# Patient Record
Sex: MALE
[De-identification: names, ages, dates, MRNs, and addresses within clinical notes are randomized per-mention and may not be internally consistent; named-entity substitution may affect disease eponyms.]

---

## 2023-01-22 ENCOUNTER — NURSE TRIAGE (OUTPATIENT)
Dept: OTHER | Facility: CLINIC | Age: 38
End: 2023-01-22

## 2023-01-22 NOTE — TELEPHONE ENCOUNTER
Location of patient: [place]    Subjective: Caller states \"I have pain in my right lower abdomen and also has a pressure in the middle of my abdomen\"   Periodic chills/sweats, nausea    Current Symptoms:     Onset: 3 days ago; worsening    Associated Symptoms:     Pain Severity: 3/10; dull, burning; constant    Temperature:  98.3    What has been tried: nothing    LMP: NA Pregnant: NA    Recommended disposition:   See HCP within 4 Hours (or PCP triage)  Care advice provided, patient verbalizes understanding; denies any other questions or concerns; instructed to call back for any new or worsening symptoms. Patient/caller agrees to proceed to nearest [facility]     This triage is a result of a call to [address] [place] [address]. Please do not respond to the triage nurse through this encounter. Any subsequent communication should be directly with the patient.     Reason for Disposition   [1] MILD-MODERATE pain AND [2] constant AND [3] present > 2 hours    Protocols used: Abdominal Pain - Male-ADULT-